# Patient Record
Sex: FEMALE | Race: WHITE
[De-identification: names, ages, dates, MRNs, and addresses within clinical notes are randomized per-mention and may not be internally consistent; named-entity substitution may affect disease eponyms.]

---

## 2022-06-25 ENCOUNTER — HOSPITAL ENCOUNTER (EMERGENCY)
Dept: HOSPITAL 33 - ED | Age: 32
Discharge: HOME | End: 2022-06-25
Payer: COMMERCIAL

## 2022-06-25 VITALS — SYSTOLIC BLOOD PRESSURE: 128 MMHG | OXYGEN SATURATION: 100 % | DIASTOLIC BLOOD PRESSURE: 103 MMHG | HEART RATE: 75 BPM

## 2022-06-25 DIAGNOSIS — Z86.16: ICD-10-CM

## 2022-06-25 DIAGNOSIS — R10.2: ICD-10-CM

## 2022-06-25 DIAGNOSIS — N23: Primary | ICD-10-CM

## 2022-06-25 DIAGNOSIS — R11.0: ICD-10-CM

## 2022-06-25 LAB
ALBUMIN SERPL-MCNC: 4.3 G/DL (ref 3.5–5)
ALP SERPL-CCNC: 61 U/L (ref 38–126)
ALT SERPL-CCNC: 30 U/L (ref 0–35)
AMYLASE SERPL-CCNC: 50 U/L (ref 30–110)
ANION GAP SERPL CALC-SCNC: 12.8 MEQ/L (ref 5–15)
AST SERPL QL: 25 U/L (ref 14–36)
BASOPHILS # BLD AUTO: 0.05 X10^3/UL (ref 0–0.4)
BILIRUB BLD-MCNC: 0.8 MG/DL (ref 0.2–1.3)
BUN SERPL-MCNC: 10 MG/DL (ref 7–17)
CALCIUM SPEC-MCNC: 9.3 MG/DL (ref 8.4–10.2)
CHLORIDE SERPL-SCNC: 107 MMOL/L (ref 98–107)
CO2 SERPL-SCNC: 24 MMOL/L (ref 22–30)
CREAT SERPL-MCNC: 0.84 MG/DL (ref 0.52–1.04)
EOSINOPHIL # BLD AUTO: 0.2 X10^3/UL (ref 0–0.5)
GFR SERPLBLD BASED ON 1.73 SQ M-ARVRAT: > 60 ML/MIN
GLUCOSE SERPL-MCNC: 124 MG/DL (ref 74–106)
GLUCOSE UR-MCNC: NEGATIVE MG/DL
HCT VFR BLD AUTO: 41.2 % (ref 35–47)
HGB BLD-MCNC: 13.1 G/DL (ref 12–16)
LIPASE SERPL-CCNC: 33 U/L (ref 23–300)
LYMPHOCYTES # SPEC AUTO: 1.2 X10^3/UL (ref 1–4.6)
MCH RBC QN AUTO: 28.5 PG (ref 26–32)
MCHC RBC AUTO-ENTMCNC: 31.8 G/DL (ref 32–36)
MONOCYTES # BLD AUTO: 0.31 X10^3/UL (ref 0–1.3)
PLATELET # BLD AUTO: 228 X10^3/UL (ref 150–450)
POTASSIUM SERPLBLD-SCNC: 4.1 MMOL/L (ref 3.5–5.1)
PROT SERPL-MCNC: 7.4 G/DL (ref 6.3–8.2)
PROT UR STRIP-MCNC: 30 MG/DL
RBC # BLD AUTO: 4.59 X10^6/UL (ref 4.1–5.4)
RBC # UR AUTO: (no result) ERY/UL (ref 0–5)
RBC #/AREA URNS HPF: >101 /HPF (ref 0–2)
SODIUM SERPL-SCNC: 139 MMOL/L (ref 137–145)
UA DIPSTICK PNL UR: (no result)
URINE CULTURED INDICATED?: YES
WBC # BLD AUTO: 6.3 X10^3/UL (ref 4–10.5)
WBC #/AREA URNS HPF: (no result) /HPF (ref 0–5)

## 2022-06-25 PROCEDURE — 87086 URINE CULTURE/COLONY COUNT: CPT

## 2022-06-25 PROCEDURE — 80053 COMPREHEN METABOLIC PANEL: CPT

## 2022-06-25 PROCEDURE — 83690 ASSAY OF LIPASE: CPT

## 2022-06-25 PROCEDURE — 36415 COLL VENOUS BLD VENIPUNCTURE: CPT

## 2022-06-25 PROCEDURE — 99283 EMERGENCY DEPT VISIT LOW MDM: CPT

## 2022-06-25 PROCEDURE — 84703 CHORIONIC GONADOTROPIN ASSAY: CPT

## 2022-06-25 PROCEDURE — 85025 COMPLETE CBC W/AUTO DIFF WBC: CPT

## 2022-06-25 PROCEDURE — 81015 MICROSCOPIC EXAM OF URINE: CPT

## 2022-06-25 PROCEDURE — 82150 ASSAY OF AMYLASE: CPT

## 2022-06-25 NOTE — ERPHSYRPT
- History of Present Illness


Time Seen by Provider: 22 10:20


Historian: patient


Exam Limitations: no limitations


Patient Subjective Stated Complaint: C/O left lower abdominal pain/left pelvic 

pain that started yesterday morning. Pain is sharp and intermittent. No current 

pain at this time.


Triage Nursing Assessment: Patient ambulated back to ED without difficulties. 

She is alert and oriented and answering questions appropriately. Bowel sounds 

hypoactive. Abdomen soft. NO SOB.


Physician History: 





C/O left lower abdominal pain/left pelvic pain that started yesterday morning. 

Pain is sharp and intermittent. No current pain at this time.


Timing/Duration: yesterday


Activities at Onset: none


Quality: cramping


Abdominal Pain Onset Location: LLQ


Pain Radiation: no radiation


Severity of Pain-Max: moderate


Severity of Pain-Current: none


Modifying Factors: Improves With: nothing


Associated Symptoms: nausea, No fever/chills


Previous symptoms: no prior history


Allergies/Adverse Reactions: 








No Known Drug Allergies Allergy (Verified 22 10:03)


   





Hx Tetanus, Diphtheria Vaccination/Date Given: Yes


Hx Influenza Vaccination/Date Given: Yes (2022)


Hx Pneumococcal Vaccination/Date Given: No


Immunizations Up to Date: Yes





Travel Risk





- International Travel


Have you traveled outside of the country in past 3 weeks: No





- Coronavirus Screening


Are you exhibiting any of the following symptoms?: No


Close contact with a COVID-19 positive Pt in past 14-21 Days: No





- Vaccine Status


Have you recieved a Covid-19 vaccination: Yes


: Moderna





- Vaccination Dates


Date of 2cond Vaccination (if applicable): 





- Review of Systems


Constitutional: No Fever, No Chills


Eyes: No Symptoms


Ears, Nose, & Throat: No Symptoms


Respiratory: No Cough, No Dyspnea


Cardiac: No Chest Pain, No Edema, No Syncope


Abdominal/Gastrointestinal: Abdominal Pain (LLQ), No Nausea, No Vomiting, No 

Diarrhea


Genitourinary Symptoms: No Dysuria


Musculoskeletal: No Back Pain, No Neck Pain


Skin: No Rash


Neurological: No Dizziness, No Focal Weakness, No Sensory Changes


Psychological: No Symptoms


Endocrine: No Symptoms


All Other Systems: Reviewed and Negative





- Past Medical History


Pertinent Past Medical History: Yes


Neurological History: No Pertinent History


ENT History: No Pertinent History


Cardiac History: No Pertinent History


Respiratory History: Other


Endocrine Medical History: No Pertinent History


Musculoskeletal History: No Pertinent History


GI Medical History: No Pertinent History


 History: No Pertinent History


Psycho-Social History: No Pertinent History


Female Reproductive Disorders: No Pertinent History


Other Medical History: Covid in 2021, Bell's Palsy





- Past Surgical History


Past Surgical History: Yes


Neuro Surgical History: No Pertinent History


Cardiac: No Pertinent History


Respiratory: No Pertinent History


Gastrointestinal: No Pertinent History


Genitourinary: No Pertinent History


Musculoskeletal: No Pertinent History


Female Surgical History:  Section


Other Surgical History: [place] teeth





- Social History


Smoking Status: Never smoker


Exposure to second hand smoke: No


Drug Use: none


Patient Lives Alone: No





- Female History


Hx Last Menstrual Period: 1 month ago; due anytime


Hx Pregnant Now: No





- Nursing Vital Signs


Nursing Vital Signs: 


                               Initial Vital Signs











Temperature  97.1 F   22 10:04


 


Pulse Rate  75   22 10:04


 


Respiratory Rate  18   22 10:04


 


Blood Pressure  128/103   22 10:04


 


O2 Sat by Pulse Oximetry  100   22 10:04








                                   Pain Scale











Pain Intensity                 0

















- Physical Exam


General Appearance: no apparent distress, alert


Eye Exam: PERRL/EOMI, eyes nml inspection


Ears, Nose, Throat Exam: normal ENT inspection, pharynx normal, moist mucous 

membranes


Neck Exam: normal inspection, non-tender, supple, full range of motion


Respiratory Exam: normal breath sounds, lungs clear, No respiratory distress


Cardiovascular Exam: regular rate/rhythm, normal heart sounds


Gastrointestinal/Abdomen Exam: soft, tenderness (LLQ), No mass


Pelvic Exam: not done


Back Exam: normal inspection, normal range of motion, No CVA tenderness, No 

vertebral tenderness


Extremity Exam: normal inspection, normal range of motion, pelvis stable


Neurologic Exam: alert, oriented x 3, cooperative, normal mood/affect, nml 

cerebellar function, sensation nml, No motor deficits


Skin Exam: normal color, warm, dry


SpO2: 100





- Course


Nursing assessment & vital signs reviewed: Yes


Ordered Tests: 


                               Active Orders 24 hr











 Category Date Time Status


 


 ABDOMEN AND PELVIS W/0 CONTRAS [CT] Stat Exams  22 10:58 Stop Req


 


 AMYLASE Stat Lab  22 10:20 Received


 


 CBC W DIFF Stat Lab  22 10:10 Completed


 


 CMP Stat Lab  22 10:20 Received


 


 CULTURE,URINE Stat Lab  22 10:11 Received


 


 HCG QUALITATIVE,SERUM Stat Lab  22 10:52 Completed


 


 LIPASE Stat Lab  22 10:20 Received


 


 UA W/RFX CULTURE Stat Lab  22 10:11 Completed











Lab/Rad Data: 


                           Laboratory Result Diagrams





                                 22 10:10 





                               Laboratory Results











  22 Range/Units





  10:52 10:11 10:10 


 


WBC    6.3  (4.0-10.5)  x10^3/uL


 


RBC    4.59  (4.1-5.4)  x10^6/uL


 


Hgb    13.1  (12.0-16.0)  g/dL


 


Hct    41.2  (35-47)  %


 


MCV    89.8  ()  fL


 


MCH    28.5  (26-32)  pg


 


MCHC    31.8 L  (32-36)  g/dL


 


RDW    13.0  (11.5-14.0)  %


 


Plt Count    228  (150-450)  x10^3/uL


 


MPV    9.9  (7.5-11.0)  fL


 


Gran %    71.8 H  (36.0-66.0)  %


 


Immature Gran % (Auto)    0.3  (0.00-0.4)  %


 


Nucleat RBC Rel Count    0.0  (0.00-0.1)  %


 


Eos # (Auto)    0.20  (0-0.5)  x10^3/uL


 


Immature Gran # (Auto)    0.02  (0.00-0.03)  x10^3u/L


 


Absolute Lymphs (auto)    1.20  (1.0-4.6)  x10^3/uL


 


Absolute Monos (auto)    0.31  (0.0-1.3)  x10^3/uL


 


Absolute Nucleated RBC    0.00  (0.00-0.01)  x10^3u/L


 


Lymphocytes %    19.0 L  (24.0-44.0)  %


 


Monocytes %    4.9  (0.0-12.0)  %


 


Eosinophils %    3.2  (0.00-5.0)  %


 


Basophils %    0.8  (0.0-0.4)  %


 


Absolute Granulocytes    4.53  (1.4-6.9)  x10^3/uL


 


Basophils #    0.05  (0-0.4)  x10^3/uL


 


Serum Pregnancy, Qual  NEGATIVE    (Negative)  


 


Urinalys Dipstick Clnc   MAIN LAB   


 


Urine Color   YELLOW   (YELLOW)  


 


Urine Appearance   SLIGHTLY CLOUDY   (CLEAR)  


 


Urine pH   5.5   (5-6)  


 


Ur Specific Gravity   >=1.030   (1.005-1.025)  


 


POC Urine Protein Conf   30   (Negative)  


 


Urine Ketones   NEGATIVE   (NEGATIVE)  


 


Urine Nitrite   NEGATIVE   (NEGATIVE)  


 


Urine Bilirubin   NEGATIVE   (NEGATIVE)  


 


Urine Urobilinogen   0.2   (0-1)  mg/dL


 


Urine Leukocytes   NEGATIVE   (NEGATIVE)  


 


Urine WBC (Auto)   6-10   (0-5)  /HPF


 


Urine RBC (Auto)   >101   (0-2)  /HPF


 


U Epithel Cells (Auto)   RARE   (FEW)  /HPF


 


Urine Bacteria (Auto)   RARE   (NEGATIVE)  /HPF


 


Urine RBC   LARGE   (0-5)  Ervin/ul


 


Urine Mucus (Auto)   MODERATE   (NEGATIVE)  /HPF


 


Ur Culture Indicated?   YES   


 


Urine Glucose   NEGATIVE   (NEGATIVE)  mg/dL














- Progress


Progress: improved


Counseled pt/family regarding: lab results, diagnosis, need for follow-up





- Departure


Departure Disposition: Home


Clinical Impression: 


 Renal colic on left side





Abdominal pain


Qualifiers:


 Abdominal location: left lower quadrant Qualified Code(s): R10.32 - Left lower 

quadrant pain





Condition: Stable


Critical Care Time: No


Referrals: 


DOCTOR,NO FAMILY [Primary Care Provider] - Follow Up with PCP/3 days


Instructions:  Renal Colic (DC), Flank Pain (DC)


Additional Instructions: 


Discharge/Care Plan





DAPHNE BLAKE was seen on 22 in the Emergency Room. The patient was 

counseled regarding Diagnosis,Lab results, Imaging studies, need for follow up 

and when to return to the Emergency Room.





Prescriptions given:





Discharge Note





I have spoken with the patient and/or caregivers. I have explained the patient's

condition, diagnosis and treatment plan based on the information available to me

at this time. I have answered the patient's and/or caregiver's questions and 

addressed any concerns. The patient and/or caregivers have as good understanding

of the patient's diagnosis, condition and treatment plan as can be expected at 

this point. The vital signs have been stable. The patient's condition is stable 

and appropriate for discharge from the emergency department.





The patient will pursue further outpatient evaluation with the primary care 

physician or other designated or consulting physician as outlined in the 

discharge instructions. The patient and/or caregivers are agreeable to this plan

of care and follow-up instructions have been explained in detail. The patient 

and/or caregivers have received these instruction. The patient/and or caregivers

are aware that any significant change in condition or worsening of symptoms s

hould prompt an immediate return to this or the closest emergency department or 

call 911. 





DAPHNE BLAKE was seen on 22 n the Emergency Room. At that time you were 

treated for an emergent condition, during your visit Laboratory, Radiology 

and/or other procedures may have been ordered. It is very important that you 

follow-up with your Primary Care Physician NO FAMILY DOCTOR within the next 24-

48 hours to review your Emergency Room visit and the final results of testing 

that was ordered.  Some test results such as Urine Cultures, Blood Cultures, and

other cultures if ordered will not be finalized for 24-48 hours.





If you do not have a Primary Care Provider please call the medical records 

department at [phone] to obtain a copy of your results or you may 

sign into our patient portal to obtain these results by visiting us @ 

[email or web address] and completing the following steps:





1. Click on the Patient Portal link





2. Click the Patient Self Enrollment Link to complete the enrollment form and 

entering your Medical Record Number [id number]





3. Once the enrollment form is completed you will receive an email with a 

temporary ID and password at the email address you provided. 





4. Next choose a user name and password. Your user name must be at least 4 

characters long and your password must be at least 4 characters long.





5. Choose a security question from the list and provide your answer to the 

question.





If you already have signed into the Health Portal you may access your Health 

Care Information   by the following steps:





1. Login to  our website @ [email or web address]





2. Enter your original user name and password.





FAQS





The [facility] Health Portal is an online tool that contains your Lab Results, 

Radiology Reports, Visit History, Discharge Instructions and Health Summary 





Lab and Radiology Results will not be available for 72 hours on the portal.





The Portal is a secure site, passwords are encryted and URLs are re-written so 

they cannot be copied and pasted. You and authorized family members are the only

ones who can access your Portal. Also there is a timeout feature that protects 

your information if you leave the Portal page open.





If you have technical difficulty please use the Contact Us link on the page this

will allow you to submit any questions you have regarding the Portal or you may 

contact the Medical Record Department at [phone].


Prescriptions: 


Ciprofloxacin [Cipro 500 MG***] 500 mg PO BID #10 tablet

## 2022-07-29 ENCOUNTER — HOSPITAL ENCOUNTER (EMERGENCY)
Dept: HOSPITAL 33 - ED | Age: 32
Discharge: HOME | End: 2022-07-29
Payer: COMMERCIAL

## 2022-07-29 VITALS — DIASTOLIC BLOOD PRESSURE: 62 MMHG | SYSTOLIC BLOOD PRESSURE: 128 MMHG | HEART RATE: 60 BPM

## 2022-07-29 VITALS — OXYGEN SATURATION: 98 %

## 2022-07-29 DIAGNOSIS — R10.32: ICD-10-CM

## 2022-07-29 DIAGNOSIS — N13.2: Primary | ICD-10-CM

## 2022-07-29 DIAGNOSIS — Z79.891: ICD-10-CM

## 2022-07-29 DIAGNOSIS — Z86.16: ICD-10-CM

## 2022-07-29 DIAGNOSIS — R11.2: ICD-10-CM

## 2022-07-29 LAB
ALBUMIN SERPL-MCNC: 4.7 G/DL (ref 3.5–5)
ALP SERPL-CCNC: 68 U/L (ref 38–126)
ALT SERPL-CCNC: 37 U/L (ref 0–35)
AMYLASE SERPL-CCNC: 62 U/L (ref 30–110)
ANION GAP SERPL CALC-SCNC: 13.5 MEQ/L (ref 5–15)
AST SERPL QL: 30 U/L (ref 14–36)
BASOPHILS # BLD AUTO: 0.06 X10^3/UL (ref 0–0.4)
BILIRUB BLD-MCNC: 0.6 MG/DL (ref 0.2–1.3)
BUN SERPL-MCNC: 14 MG/DL (ref 7–17)
CALCIUM SPEC-MCNC: 9.8 MG/DL (ref 8.4–10.2)
CHLORIDE SERPL-SCNC: 107 MMOL/L (ref 98–107)
CO2 SERPL-SCNC: 24 MMOL/L (ref 22–30)
CREAT SERPL-MCNC: 0.97 MG/DL (ref 0.52–1.04)
EOSINOPHIL # BLD AUTO: 0.06 X10^3/UL (ref 0–0.5)
GFR SERPLBLD BASED ON 1.73 SQ M-ARVRAT: > 60 ML/MIN
GLUCOSE SERPL-MCNC: 107 MG/DL (ref 74–106)
GLUCOSE UR-MCNC: NEGATIVE MG/DL
HCT VFR BLD AUTO: 42.8 % (ref 35–47)
HGB BLD-MCNC: 13.9 G/DL (ref 12–16)
LIPASE SERPL-CCNC: 32 U/L (ref 23–300)
LYMPHOCYTES # SPEC AUTO: 1.37 X10^3/UL (ref 1–4.6)
MCH RBC QN AUTO: 28.5 PG (ref 26–32)
MCHC RBC AUTO-ENTMCNC: 32.5 G/DL (ref 32–36)
MONOCYTES # BLD AUTO: 0.47 X10^3/UL (ref 0–1.3)
PLATELET # BLD AUTO: 322 X10^3/UL (ref 150–450)
POTASSIUM SERPLBLD-SCNC: 4.2 MMOL/L (ref 3.5–5.1)
PROT SERPL-MCNC: 8.1 G/DL (ref 6.3–8.2)
PROT UR STRIP-MCNC: NEGATIVE MG/DL
RBC # BLD AUTO: 4.88 X10^6/UL (ref 4.1–5.4)
RBC # UR AUTO: (no result) ERY/UL (ref 0–5)
RBC #/AREA URNS HPF: (no result) /HPF (ref 0–2)
SODIUM SERPL-SCNC: 141 MMOL/L (ref 137–145)
UA DIPSTICK PNL UR: (no result)
URINE CULTURED INDICATED?: NO
WBC # BLD AUTO: 10.2 X10^3/UL (ref 4–10.5)
WBC #/AREA URNS HPF: (no result) /HPF (ref 0–5)

## 2022-07-29 PROCEDURE — 81025 URINE PREGNANCY TEST: CPT

## 2022-07-29 PROCEDURE — 83605 ASSAY OF LACTIC ACID: CPT

## 2022-07-29 PROCEDURE — 36000 PLACE NEEDLE IN VEIN: CPT

## 2022-07-29 PROCEDURE — 96360 HYDRATION IV INFUSION INIT: CPT

## 2022-07-29 PROCEDURE — 81015 MICROSCOPIC EXAM OF URINE: CPT

## 2022-07-29 PROCEDURE — 96375 TX/PRO/DX INJ NEW DRUG ADDON: CPT

## 2022-07-29 PROCEDURE — 96374 THER/PROPH/DIAG INJ IV PUSH: CPT

## 2022-07-29 PROCEDURE — 85025 COMPLETE CBC W/AUTO DIFF WBC: CPT

## 2022-07-29 PROCEDURE — 74176 CT ABD & PELVIS W/O CONTRAST: CPT

## 2022-07-29 PROCEDURE — 80053 COMPREHEN METABOLIC PANEL: CPT

## 2022-07-29 PROCEDURE — 36415 COLL VENOUS BLD VENIPUNCTURE: CPT

## 2022-07-29 PROCEDURE — 83690 ASSAY OF LIPASE: CPT

## 2022-07-29 PROCEDURE — 82150 ASSAY OF AMYLASE: CPT

## 2022-07-29 PROCEDURE — 99284 EMERGENCY DEPT VISIT MOD MDM: CPT

## 2022-07-29 NOTE — XRAY
Indication: Left abdomen pain.



Multiple contiguous axial images obtained through abdomen and pelvis without

contrast.



Comparison: None



Lung bases demonstrates mild dependent atelectasis.  No infiltrate or

effusion.  Heart not enlarged.



Noncontrasted stomach and bowel loops appear nonobstructed with normal

appendix.  Mild scattered colonic diverticulosis without diverticulitis.

Distal left ureter demonstrates 6 mm stone approximately 4 cm proximal to the

UVJ.  Proximal left ureter is distended up to 1 cm along with mild

hydronephrosis and renal edema consistent with obstructive uropathy.

Additional left mid renal punctate calculus.



Gallbladder mildly distended with several stones, largest 1.8 cm.  Mild fatty

liver with patchy areas of fatty sparing.  No free fluid/air.



Remaining liver, gallbladder, pancreas, spleen, adrenal glands, kidneys,

ureters, bladder, uterus, and aorta are unremarkable for noncontrast exam.



Osseous structures intact with minimal levoscoliosis centered at L3.



Impression:

1.  6 mm distal left ureter calculus producing obstructive uropathy as

detailed.  Additional left renal punctate calculus.

2.  Mild distended gallbladder with multiple stones.  Sonogram may yield

further information if clinically warranted.

3.  Incidental fatty liver, colonic diverticulosis, and minimal levoscoliosis.

## 2022-07-29 NOTE — ERPHSYRPT
- History of Present Illness


Time Seen by Provider: 22 11:51


Historian: patient


Physician History: 





This is a 32-year-old white female patient who presents with 1 day history of 

left flank pain which responded to naproxen.  However this morning, the left 

flank pain was still present but not as intensely tender but the pain now 

radiates into the left lower quadrant and this pain has become more severe.  She

has had associated nausea and vomiting.  She denies diarrhea.  She has a history

of ureterolithiasis in the past.  She denies chest pain.  She denies shortness 

of breath.  Patient has had a  section performed in the past she has no 

known drug allergies and takes no medications chronically.


Timing/Duration: yesterday


Activities at Onset: none


Quality: aching, sharpness


Abdominal Pain Onset Location: flank (Left)


Pain Radiation: LLQ


Severity of Pain-Max: moderate


Severity of Pain-Current: moderate


Associated Symptoms: other (Naproxen helped the left flank pain yesterday)


Previous symptoms: same symptoms as today


Allergies/Adverse Reactions: 








No Known Drug Allergies Allergy (Verified 22 10:03)


   





Hx Tetanus, Diphtheria Vaccination/Date Given: Yes


Hx Influenza Vaccination/Date Given: Yes (2022)


Hx Pneumococcal Vaccination/Date Given: No





Travel Risk





- International Travel


Have you traveled outside of the country in past 3 weeks: No





- Coronavirus Screening


Are you exhibiting any of the following symptoms?: No


Close contact with a COVID-19 positive Pt in past 14-21 Days: No





- Vaccine Status


Have you recieved a Covid-19 vaccination: Yes


: Moderna





- Vaccination Dates


Date of 2cond Vaccination (if applicable): 





- Review of Systems


Constitutional: No Symptoms


Eyes: No Symptoms


Ears, Nose, & Throat: No Symptoms


Respiratory: No Symptoms


Cardiac: No Symptoms


Abdominal/Gastrointestinal: Abdominal Pain, Nausea, Vomiting


Genitourinary Symptoms: No Symptoms


Musculoskeletal: No Symptoms


Skin: No Symptoms


Neurological: No Symptoms


Psychological: No Symptoms


Endocrine: No Symptoms


Hematologic/Lymphatic: No Symptoms


Immunological/Allergic: No Symptoms


All Other Systems: Reviewed and Negative





- Past Medical History


Pertinent Past Medical History: Yes


Neurological History: No Pertinent History


ENT History: No Pertinent History


Cardiac History: No Pertinent History


Respiratory History: Other


Endocrine Medical History: No Pertinent History


Musculoskeletal History: No Pertinent History


GI Medical History: No Pertinent History


 History: No Pertinent History


Psycho-Social History: No Pertinent History


Female Reproductive Disorders: No Pertinent History


Other Medical History: Covid in 2021, Bell's Palsy





- Past Surgical History


Past Surgical History: Yes


Neuro Surgical History: No Pertinent History


Cardiac: No Pertinent History


Respiratory: No Pertinent History


Gastrointestinal: No Pertinent History


Genitourinary: No Pertinent History


Musculoskeletal: No Pertinent History


Female Surgical History:  Section


Other Surgical History: [place] teeth





- Social History


Smoking Status: Never smoker


Exposure to second hand smoke: No


Drug Use: none


Patient Lives Alone: No





- Nursing Vital Signs


Nursing Vital Signs: 


                               Initial Vital Signs











Temperature  97.1 F   22 11:54


 


Pulse Rate  78   22 11:54


 


Respiratory Rate  20   22 11:54


 


Blood Pressure  124/86   22 11:54


 


O2 Sat by Pulse Oximetry  100   22 11:54








                                   Pain Scale











Pain Intensity                 0

















- Physical Exam


General Appearance: mild distress, alert, anxiety, obese


Eye Exam: PERRL/EOMI, eyes nml inspection


Ears, Nose, Throat Exam: normal ENT inspection, moist mucous membranes


Neck Exam: normal inspection, non-tender, supple, full range of motion


Respiratory Exam: normal breath sounds, lungs clear, airway intact, No chest 

tenderness, No respiratory distress


Cardiovascular Exam: regular rate/rhythm, normal heart sounds, normal peripheral

pulses


Gastrointestinal/Abdomen Exam: soft, normal bowel sounds, tenderness (Left flank

and left lower quadrant), guarding (Left lower quadrant), rebound (Left lower 

quadrant)


Pelvic Exam: not done


Rectal Exam: not done


Back Exam: normal inspection, normal range of motion, CVA tenderness (Left 

flank), No vertebral tenderness


Extremity Exam: normal inspection, normal range of motion, pelvis stable


Neurologic Exam: alert, oriented x 3, cooperative, CNs II-XII nml as tested, 

normal mood/affect, nml cerebellar function, nml station & gait, sensation nml


Skin Exam: normal color, warm, dry


Lymphatic Exam: adenopathy


**SpO2 Interpretation**: normal


O2 Delivery: Room Air


Ordered Tests: 


                               Active Orders 24 hr











 Category Date Time Status


 


 IV Insertion STAT Care  22 12:14 Active


 


 ABDOMEN AND PELVIS W/0 CONTRAS [CT] Stat Exams  22 12:14 Completed


 


 AMYLASE Stat Lab  22 12:15 Completed


 


 CBC W DIFF Stat Lab  22 12:14 Completed


 


 CMP Stat Lab  22 12:15 Completed


 


 HCG,QUALITATIVE URINE Stat Lab  22 12:31 Completed


 


 LIPASE Stat Lab  22 12:15 Completed


 


 Lactic Acid Stat Lab  22 12:20 Completed


 


 UA W/RFX CULTURE Stat Lab  22 12:31 Completed








Medication Summary














Discontinued Medications














Generic Name Dose Route Start Last Admin





  Trade Name Alfonsoq  PRN Reason Stop Dose Admin


 


Hydromorphone HCl  1 mg  22 12:14  22 12:17





  Hydromorphone 1 Mg/1ml Inj*** 1 Mg/Ml Syringe  IV  22 12:15  1 mg





  STAT ONE   Administration


 


Hydromorphone HCl  Confirm  22 12:16 





  Hydromorphone 1 Mg/1ml Inj*** 1 Mg/Ml Syringe  Administered  22 12:17 





  Dose  





  1 mg  





  .ROUTE  





  .STK-MED ONE  


 


Sodium Chloride  1,000 mls @ 999 mls/hr  22 12:14  22 13:38





  Sodium Chloride 0.9% 1000 Ml  IV  22 13:14  Infused





  .Q1H1M STA   Infusion


 


Sodium Chloride  Confirm  22 12:16 





  Sodium Chloride 0.9% 1000 Ml  Administered  22 12:17 





  Dose  





  1,000 mls @ ud  





  .ROUTE  





  .STK-MED ONE  


 


Ketorolac Tromethamine  30 mg  22 12:14  22 12:18





  Ketorolac Tromethamine 30 Mg/Ml Inj  IV  22 12:15  30 mg





  STAT ONE   Administration


 


Ketorolac Tromethamine  Confirm  22 12:15 





  Ketorolac Tromethamine 30 Mg/Ml Inj  Administered  22 12:16 





  Dose  





  30 mg  





  .ROUTE  





  .STK-MED ONE  


 


Ondansetron HCl  4 mg  22 12:14  22 12:18





  Ondansetron Hcl 4 Mg/2 Ml Vial  IV  22 12:15  4 mg





  STAT ONE   Administration


 


Ondansetron HCl  Confirm  22 12:15 





  Ondansetron Hcl 4 Mg/2 Ml Vial  Administered  22 12:16 





  Dose  





  4 mg  





  .ROUTE  





  .STK-MED ONE  











Lab/Rad Data: 


                           Laboratory Result Diagrams





                                 22 12:14 





                                 22 12:15 





                               Laboratory Results











  22 Range/Units





  12:31 12:31 12:20 


 


WBC     (4.0-10.5)  x10^3/uL


 


RBC     (4.1-5.4)  x10^6/uL


 


Hgb     (12.0-16.0)  g/dL


 


Hct     (35-47)  %


 


MCV     ()  fL


 


MCH     (26-32)  pg


 


MCHC     (32-36)  g/dL


 


RDW     (11.5-14.0)  %


 


Plt Count     (150-450)  x10^3/uL


 


MPV     (7.5-11.0)  fL


 


Gran %     (36.0-66.0)  %


 


Immature Gran % (Auto)     (0.00-0.4)  %


 


Nucleat RBC Rel Count     (0.00-0.1)  %


 


Eos # (Auto)     (0-0.5)  x10^3/uL


 


Immature Gran # (Auto)     (0.00-0.03)  x10^3u/L


 


Absolute Lymphs (auto)     (1.0-4.6)  x10^3/uL


 


Absolute Monos (auto)     (0.0-1.3)  x10^3/uL


 


Absolute Nucleated RBC     (0.00-0.01)  x10^3u/L


 


Lymphocytes %     (24.0-44.0)  %


 


Monocytes %     (0.0-12.0)  %


 


Eosinophils %     (0.00-5.0)  %


 


Basophils %     (0.0-0.4)  %


 


Absolute Granulocytes     (1.4-6.9)  x10^3/uL


 


Basophils #     (0-0.4)  x10^3/uL


 


Sodium     (137-145)  mmol/L


 


Potassium     (3.5-5.1)  mmol/L


 


Chloride     ()  mmol/L


 


Carbon Dioxide     (22-30)  mmol/L


 


Anion Gap     (5-15)  MEQ/L


 


BUN     (7-17)  mg/dL


 


Creatinine     (0.52-1.04)  mg/dL


 


Estimated GFR     ML/MIN


 


Glucose     ()  mg/dL


 


Lactic Acid    1.2  (0.4-2.0)  


 


Calcium     (8.4-10.2)  mg/dL


 


Total Bilirubin     (0.2-1.3)  mg/dL


 


AST     (14-36)  U/L


 


ALT     (0-35)  U/L


 


Alkaline Phosphatase     ()  U/L


 


Serum Total Protein     (6.3-8.2)  g/dL


 


Albumin     (3.5-5.0)  g/dL


 


Amylase     ()  U/L


 


Lipase     ()  U/L


 


Urinalys Dipstick Clnc  MAIN LAB    


 


Urine Color  YELLOW    (YELLOW)  


 


Urine Appearance  CLEAR    (CLEAR)  


 


Urine pH  5.5    (5-6)  


 


Ur Specific Gravity  >=1.030    (1.005-1.025)  


 


POC Urine Protein Conf  NEGATIVE    (Negative)  


 


Urine Ketones  NEGATIVE    (NEGATIVE)  


 


Urine Nitrite  NEGATIVE    (NEGATIVE)  


 


Urine Bilirubin  NEGATIVE    (NEGATIVE)  


 


Urine Urobilinogen  0.2    (0-1)  mg/dL


 


Urine Leukocytes  NEGATIVE    (NEGATIVE)  


 


Urine WBC (Auto)  0-2    (0-5)  /HPF


 


Urine RBC (Auto)  3-5    (0-2)  /HPF


 


U Epithel Cells (Auto)  FEW    (FEW)  /HPF


 


Urine Bacteria (Auto)  RARE    (NEGATIVE)  /HPF


 


Urine RBC  TRACE-LYSED    (0-5)  Ervin/ul


 


Urine Mucus (Auto)  SLIGHT    (NEGATIVE)  /HPF


 


Ur Culture Indicated?  NO    


 


Urine Glucose  NEGATIVE    (NEGATIVE)  mg/dL


 


Urine HCG, Qual   NEGATIVE   (Negative)  














  22 Range/Units





  12:15 12:14 


 


WBC   10.2  (4.0-10.5)  x10^3/uL


 


RBC   4.88  (4.1-5.4)  x10^6/uL


 


Hgb   13.9  (12.0-16.0)  g/dL


 


Hct   42.8  (35-47)  %


 


MCV   87.7  ()  fL


 


MCH   28.5  (26-32)  pg


 


MCHC   32.5  (32-36)  g/dL


 


RDW   13.1  (11.5-14.0)  %


 


Plt Count   322  (150-450)  x10^3/uL


 


MPV   9.8  (7.5-11.0)  fL


 


Gran %   80.4 H  (36.0-66.0)  %


 


Immature Gran % (Auto)   0.4  (0.00-0.4)  %


 


Nucleat RBC Rel Count   0.0  (0.00-0.1)  %


 


Eos # (Auto)   0.06  (0-0.5)  x10^3/uL


 


Immature Gran # (Auto)   0.04 H  (0.00-0.03)  x10^3u/L


 


Absolute Lymphs (auto)   1.37  (1.0-4.6)  x10^3/uL


 


Absolute Monos (auto)   0.47  (0.0-1.3)  x10^3/uL


 


Absolute Nucleated RBC   0.00  (0.00-0.01)  x10^3u/L


 


Lymphocytes %   13.4 L  (24.0-44.0)  %


 


Monocytes %   4.6  (0.0-12.0)  %


 


Eosinophils %   0.6  (0.00-5.0)  %


 


Basophils %   0.6  (0.0-0.4)  %


 


Absolute Granulocytes   8.23 H  (1.4-6.9)  x10^3/uL


 


Basophils #   0.06  (0-0.4)  x10^3/uL


 


Sodium  141   (137-145)  mmol/L


 


Potassium  4.2   (3.5-5.1)  mmol/L


 


Chloride  107   ()  mmol/L


 


Carbon Dioxide  24   (22-30)  mmol/L


 


Anion Gap  13.5   (5-15)  MEQ/L


 


BUN  14   (7-17)  mg/dL


 


Creatinine  0.97   (0.52-1.04)  mg/dL


 


Estimated GFR  > 60.0   ML/MIN


 


Glucose  107 H   ()  mg/dL


 


Lactic Acid    (0.4-2.0)  


 


Calcium  9.8   (8.4-10.2)  mg/dL


 


Total Bilirubin  0.60   (0.2-1.3)  mg/dL


 


AST  30   (14-36)  U/L


 


ALT  37 H   (0-35)  U/L


 


Alkaline Phosphatase  68   ()  U/L


 


Serum Total Protein  8.1   (6.3-8.2)  g/dL


 


Albumin  4.7   (3.5-5.0)  g/dL


 


Amylase  62   ()  U/L


 


Lipase  32   ()  U/L


 


Urinalys Dipstick Clnc    


 


Urine Color    (YELLOW)  


 


Urine Appearance    (CLEAR)  


 


Urine pH    (5-6)  


 


Ur Specific Gravity    (1.005-1.025)  


 


POC Urine Protein Conf    (Negative)  


 


Urine Ketones    (NEGATIVE)  


 


Urine Nitrite    (NEGATIVE)  


 


Urine Bilirubin    (NEGATIVE)  


 


Urine Urobilinogen    (0-1)  mg/dL


 


Urine Leukocytes    (NEGATIVE)  


 


Urine WBC (Auto)    (0-5)  /HPF


 


Urine RBC (Auto)    (0-2)  /HPF


 


U Epithel Cells (Auto)    (FEW)  /HPF


 


Urine Bacteria (Auto)    (NEGATIVE)  /HPF


 


Urine RBC    (0-5)  Ervin/ul


 


Urine Mucus (Auto)    (NEGATIVE)  /HPF


 


Ur Culture Indicated?    


 


Urine Glucose    (NEGATIVE)  mg/dL


 


Urine HCG, Qual    (Negative)  














- Progress


Progress: improved, pain not gone completely


Progress Note: 





22 14:02


CT scan of the abdomen pelvis shows a 6 mm distal ureteral calculus causing 

obstructive uropathy.  The stone is 4 cm proximal to the UVJ and the proximal 

ureter is distended up to 1 cm.  There is mild hydronephrosis present and mild 

renal edema present.





Medical decision making: We well attempt to contact the urologist today to 

arrange for this patient to be seen today if possible.  Her pain is well 

controlled now.  We will send to her pharmacy some narcotic pain medicine.  In 

addition she will take ibuprofen.  If her pain worsens we will instruct her to 

proceed to the emergency department at [facility] or [facility] in 

[facility].  They have urologist on call.


Counseled pt/family regarding: lab results, diagnosis, need for follow-up, rad 

results





- Departure


Departure Disposition: Home


Clinical Impression: 


 Left ureteral calculus





Condition: Stable


Critical Care Time: No


Referrals: 


DOCTOR,NO FAMILY [NON-STAFF PHY W/O PRIVILEGES] - Follow up/PCP as directed


Additional Instructions: 


Drink plenty of fluids.  Take ibuprofen 600 mg with food 3 times a day for the 

next 5 days.  Use the prescription Norco that is called into your pharmacy.  If 

your symptoms worsen proceed to the emergency department at either [facility] or [facility] as they have urology on-call.  Follow-up with the 

urology appointment we are making arrangements for you to have.


Prescriptions: 


Hydrocodone/APAP 5/325*** [Norco 5/325 mg***] 1 each PO Q8H PRN PRN #10 tablet 

MDD 3


 PRN Reason: Pain


Tamsulosin HCl 0.4 mg*** [Flomax 0.4 MG***] 0.4 mg PO DAILY #7 cap